# Patient Record
Sex: FEMALE | Race: WHITE | NOT HISPANIC OR LATINO | URBAN - NONMETROPOLITAN AREA
[De-identification: names, ages, dates, MRNs, and addresses within clinical notes are randomized per-mention and may not be internally consistent; named-entity substitution may affect disease eponyms.]

---

## 2020-07-13 ENCOUNTER — IMPORTED ENCOUNTER (OUTPATIENT)
Dept: URBAN - NONMETROPOLITAN AREA CLINIC 1 | Facility: CLINIC | Age: 60
End: 2020-07-13

## 2020-07-13 PROBLEM — H52.4: Noted: 2020-07-13

## 2020-07-13 PROBLEM — H52.223: Noted: 2020-07-13

## 2020-07-13 PROBLEM — H52.13: Noted: 2017-10-02

## 2020-07-13 PROBLEM — H35.371: Noted: 2020-07-13

## 2020-07-13 PROBLEM — H25.13: Noted: 2020-07-13

## 2020-07-13 PROCEDURE — 92014 COMPRE OPH EXAM EST PT 1/>: CPT

## 2020-07-13 PROCEDURE — 92015 DETERMINE REFRACTIVE STATE: CPT

## 2020-07-13 NOTE — PATIENT DISCUSSION
Compound Myopic Astigmatism OU w/Presbyopia-  discussed findings w/patient-  new spectacle Rx issued DVO-  patient has been taking glasses off for computer and reading and prefers doing this over using the bifocal -  discussed possibly getting computer glasses-  continue to monitor yearly or prnCataracts OU - discussed diagnosis in detail with patient - discussed signs and symptoms associated w/ progression - no treatment indicated at this time - continue to monitor yearly for changes ERMD OD - trace - disucssed diagnosis in detail with patient - continue to monitor for changes/progression; 's Notes: MR 7/13/2020DFE 7/13/2020

## 2022-03-03 NOTE — PATIENT DISCUSSION
No signs of anything that is causing the symptoms that she is experiencing. Surface of eye is healthy.

## 2022-04-09 ASSESSMENT — TONOMETRY
OD_IOP_MMHG: 16
OS_IOP_MMHG: 16

## 2022-04-09 ASSESSMENT — VISUAL ACUITY
OU_SC: J1+
OD_SC: 20/30
OS_SC: 20/40

## 2025-02-19 ENCOUNTER — EMERGENCY VISIT (OUTPATIENT)
Age: 65
End: 2025-02-19

## 2025-02-19 DIAGNOSIS — H35.371: ICD-10-CM

## 2025-02-19 DIAGNOSIS — H40.013: ICD-10-CM

## 2025-02-19 DIAGNOSIS — H00.15: ICD-10-CM

## 2025-02-19 DIAGNOSIS — H25.813: ICD-10-CM

## 2025-02-19 PROCEDURE — 99203 OFFICE O/P NEW LOW 30 MIN: CPT

## 2025-04-29 ENCOUNTER — COMPREHENSIVE EXAM (OUTPATIENT)
Age: 65
End: 2025-04-29

## 2025-04-29 DIAGNOSIS — H35.363: ICD-10-CM

## 2025-04-29 DIAGNOSIS — H52.4: ICD-10-CM

## 2025-04-29 DIAGNOSIS — H52.223: ICD-10-CM

## 2025-04-29 DIAGNOSIS — H25.813: ICD-10-CM

## 2025-04-29 DIAGNOSIS — H40.013: ICD-10-CM

## 2025-04-29 DIAGNOSIS — H52.13: ICD-10-CM

## 2025-04-29 DIAGNOSIS — H00.15: ICD-10-CM

## 2025-04-29 PROCEDURE — 76514 ECHO EXAM OF EYE THICKNESS: CPT

## 2025-04-29 PROCEDURE — 92133 CPTRZD OPH DX IMG PST SGM ON: CPT

## 2025-04-29 PROCEDURE — 92015 DETERMINE REFRACTIVE STATE: CPT

## 2025-04-29 PROCEDURE — 99214 OFFICE O/P EST MOD 30 MIN: CPT
